# Patient Record
Sex: FEMALE | Race: WHITE | NOT HISPANIC OR LATINO | URBAN - METROPOLITAN AREA
[De-identification: names, ages, dates, MRNs, and addresses within clinical notes are randomized per-mention and may not be internally consistent; named-entity substitution may affect disease eponyms.]

---

## 2022-06-06 ENCOUNTER — OFFICE VISIT (OUTPATIENT)
Dept: URBAN - METROPOLITAN AREA CLINIC 78 | Facility: CLINIC | Age: 62
End: 2022-06-06
Payer: COMMERCIAL

## 2022-06-06 VITALS
TEMPERATURE: 97.6 F | SYSTOLIC BLOOD PRESSURE: 166 MMHG | BODY MASS INDEX: 23.05 KG/M2 | RESPIRATION RATE: 16 BRPM | DIASTOLIC BLOOD PRESSURE: 90 MMHG | HEIGHT: 66 IN | WEIGHT: 143.4 LBS | HEART RATE: 84 BPM

## 2022-06-06 DIAGNOSIS — R14.0 BLOATING: ICD-10-CM

## 2022-06-06 DIAGNOSIS — R15.2 FECAL URGENCY: ICD-10-CM

## 2022-06-06 DIAGNOSIS — R19.7 DIARRHEA, UNSPECIFIED TYPE: ICD-10-CM

## 2022-06-06 PROCEDURE — 99204 OFFICE O/P NEW MOD 45 MIN: CPT | Performed by: INTERNAL MEDICINE

## 2022-06-06 PROCEDURE — 99244 OFF/OP CNSLTJ NEW/EST MOD 40: CPT | Performed by: INTERNAL MEDICINE

## 2022-06-06 RX ORDER — SODIUM PICOSULFATE, MAGNESIUM OXIDE, AND ANHYDROUS CITRIC ACID 10; 3.5; 12 MG/160ML; G/160ML; G/160ML
160ML AS DIRECTED LIQUID ORAL ONCE
Qty: 1 | Refills: 0 | OUTPATIENT
Start: 2022-06-06 | End: 2022-06-07

## 2022-06-06 NOTE — HPI-TODAY'S VISIT:
The patient was referred to us by Dr. Roberto Craft for GI issues. A copy of this note will be sent to the referring physician.   The patient states that for as long as she remembers she has been struggling with alternating diarrhea and constipation. Lately she has had mainly diarrhea and fecal urgency. She has about 5-6 watery stools daily and is afraid to leave her house. She has at times noted these to float, but denies noticing them to be greasy per se or very foul smelling. It is affecting her QOL. No sick contacts or recent antibitoics use. No travel abroad or camping trips.  She has had minimal abdominal pain/cramping, but has noted increased bloating.  There is no recent history of rectal bleeding, anorexia or unintentional weight loss. She has been avodiing eating as it triggers the fecal urgency. She has used Imodium x1 daily with good results, although not lately.   She has gained ~15 lbs in the past year and a half.  Regarding any upper GI complaints, the patient has not had heartburn, nausea, vomiting or dysphagia.   The patient does not take blood thinners.  They deny any CP or ZACARIAS.  She has her GB in place.   The patient last had a colonoscopy ~2014. She had biopsies obtained then which were negative for microscopic colitis. There is no FH of colon cancer or colon polyps. father/mother

## 2022-08-10 ENCOUNTER — CLAIMS CREATED FROM THE CLAIM WINDOW (OUTPATIENT)
Dept: URBAN - METROPOLITAN AREA CLINIC 4 | Facility: CLINIC | Age: 62
End: 2022-08-10
Payer: COMMERCIAL

## 2022-08-10 ENCOUNTER — OFFICE VISIT (OUTPATIENT)
Dept: URBAN - METROPOLITAN AREA SURGERY CENTER 15 | Facility: SURGERY CENTER | Age: 62
End: 2022-08-10
Payer: COMMERCIAL

## 2022-08-10 DIAGNOSIS — R15.2 FECAL URGENCY: ICD-10-CM

## 2022-08-10 DIAGNOSIS — R19.7 DIARRHEA, UNSPECIFIED TYPE: ICD-10-CM

## 2022-08-10 DIAGNOSIS — K63.89 OTHER SPECIFIED DISEASES OF INTESTINE: ICD-10-CM

## 2022-08-10 PROCEDURE — 45380 COLONOSCOPY AND BIOPSY: CPT | Performed by: INTERNAL MEDICINE

## 2022-08-10 PROCEDURE — G8907 PT DOC NO EVENTS ON DISCHARG: HCPCS | Performed by: INTERNAL MEDICINE

## 2022-08-10 PROCEDURE — 88305 TISSUE EXAM BY PATHOLOGIST: CPT | Performed by: PATHOLOGY

## 2022-08-17 ENCOUNTER — OFFICE VISIT (OUTPATIENT)
Dept: URBAN - METROPOLITAN AREA SURGERY CENTER 15 | Facility: SURGERY CENTER | Age: 62
End: 2022-08-17

## 2022-08-31 ENCOUNTER — TELEPHONE ENCOUNTER (OUTPATIENT)
Dept: URBAN - METROPOLITAN AREA CLINIC 78 | Facility: CLINIC | Age: 62
End: 2022-08-31

## 2022-11-01 ENCOUNTER — OFFICE VISIT (OUTPATIENT)
Dept: URBAN - METROPOLITAN AREA CLINIC 78 | Facility: CLINIC | Age: 62
End: 2022-11-01
Payer: COMMERCIAL

## 2022-11-01 ENCOUNTER — DASHBOARD ENCOUNTERS (OUTPATIENT)
Age: 62
End: 2022-11-01

## 2022-11-01 VITALS
BODY MASS INDEX: 22.34 KG/M2 | HEIGHT: 66 IN | SYSTOLIC BLOOD PRESSURE: 150 MMHG | TEMPERATURE: 97.9 F | HEART RATE: 66 BPM | WEIGHT: 139 LBS | DIASTOLIC BLOOD PRESSURE: 82 MMHG

## 2022-11-01 DIAGNOSIS — R19.7 DIARRHEA, UNSPECIFIED TYPE: ICD-10-CM

## 2022-11-01 DIAGNOSIS — R15.2 FECAL URGENCY: ICD-10-CM

## 2022-11-01 DIAGNOSIS — R14.0 BLOATING: ICD-10-CM

## 2022-11-01 PROCEDURE — 99214 OFFICE O/P EST MOD 30 MIN: CPT | Performed by: INTERNAL MEDICINE

## 2022-11-01 RX ORDER — CHOLESTYRAMINE 4 G/9G
1 PACKET MIXED WITH WATER OR NON-CARBONATED DRINK POWDER, FOR SUSPENSION ORAL ONCE A DAY
Qty: 30 | Refills: 2 | OUTPATIENT
Start: 2022-11-01

## 2022-11-01 NOTE — HPI-TODAY'S VISIT:
The patient was referred to us by Dr. Roberto Craft for GI issues. A copy of this note will be sent to the referring physician.   The patient states that for as long as she remembers she has been struggling with alternating diarrhea and constipation. Lately she has had mainly diarrhea and fecal urgency. She has about 5-6 watery stools daily and is afraid to leave her house. She has at times noted these to float, but denies noticing them to be greasy per se or very foul smelling. It is affecting her QOL. No sick contacts or recent antibitoics use. No travel abroad or camping trips.  She has had minimal abdominal pain/cramping, but has noted increased bloating.  Today we reviewed the results of her recent colonoscopy/path.   There is no recent history of rectal bleeding, anorexia or unintentional weight loss. She has been avodiing eating as it triggers the fecal urgency. She has used Imodium (anywher between 1-several) daily with good results.  She has gained ~15 lbs in the past year and a half.  Regarding any upper GI complaints, the patient has not had heartburn, nausea, vomiting or dysphagia.   The patient does not take blood thinners.  They deny any CP or ZACARIAS.  She has her GB in place.   The patient last had a colonoscopy ~2014. She had biopsies obtained then which were negative for microscopic colitis. There is no FH of colon cancer or colon polyps.  Summary of prior workup: - Colonoscopy by me in Aug 2022: Normal colon to the TI. Random colon biopsies neg for microscopic colitis. No polyps.

## 2023-01-17 ENCOUNTER — OFFICE VISIT (OUTPATIENT)
Dept: URBAN - METROPOLITAN AREA CLINIC 78 | Facility: CLINIC | Age: 63
End: 2023-01-17